# Patient Record
(demographics unavailable — no encounter records)

---

## 2025-05-13 NOTE — PHYSICAL EXAM
[Midline] : trachea located in midline position [Normal] : no rashes [FreeTextEntry1] : Overweight, NAD  [de-identified] : Thyroid nodules nonpalpable [de-identified] : Mild tonsil asymmetry, L>R

## 2025-05-13 NOTE — HISTORY OF PRESENT ILLNESS
[de-identified] : 51F presented to [facility] ED 4/2225 with 1 day of foreign body sensation and dysphagia.  She went to [facility] and had a CT scan w/o contrast that showed a 7mm thyroid nodule and then went to [facility] because she felt something moving around in her throat, then presented to [facility] ED for ENT eval.  On exam in ED, patient was breathing comfortably on RA, tolerating secretions, tonsils 3+ with edematous and elongated uvula, scope with widely patent airway with enlarged tonsils. Exam c/w uvulitis. Patient was administered Decadron with symptomatic improvement. At this time, patient endorses throat swelling with tenderness, hoarseness. Patient denies fevers, dysphagia, odynophagia, globus, or otalgia.

## 2025-05-13 NOTE — PHYSICAL EXAM
[Midline] : trachea located in midline position [Normal] : no rashes [FreeTextEntry1] : Overweight, NAD  [de-identified] : Thyroid nodules nonpalpable [de-identified] : Mild tonsil asymmetry, L>R

## 2025-05-13 NOTE — CONSULT LETTER
[Dear  ___] : Dear  [unfilled], [Courtesy Letter:] : I had the pleasure of seeing your patient, [unfilled], in my office today. [Please see my note below.] : Please see my note below. [Sincerely,] : Sincerely, [FreeTextEntry2] : Tereso Duran MD [FreeTextEntry3] : Davina Bolivar PA-C Department of Otolaryngology [facility] Ot[facility] at [place]   George Steward MD, FACS Chief of Otolaryngology at [facility]  Dept. of Otolaryngology [facility]

## 2025-05-13 NOTE — CONSULT LETTER
[Dear  ___] : Dear  [unfilled], [Courtesy Letter:] : I had the pleasure of seeing your patient, [unfilled], in my office today. [Please see my note below.] : Please see my note below. [Sincerely,] : Sincerely, [FreeTextEntry2] : Tereso Duran MD [FreeTextEntry3] : Davina Bolivar PA-C Department of Otolaryngology [facility] Otolaryngology at [place]   George Steward MD, FACS Chief of Otolaryngology at [facility]  Dept. of Otolaryngology [facility]

## 2025-05-13 NOTE — ASSESSMENT
[FreeTextEntry1] : Pt to undergo a thyroid US for further evaluation of incidentally identified thyroid nodule. She will follow up in 3 months to recheck tonsil asymmetry.  Pt reports a h/o phlegm in the throat during Spring allergy season.  Pt to use Claritin prn.